# Patient Record
Sex: FEMALE | Race: WHITE | HISPANIC OR LATINO | Employment: PART TIME | URBAN - METROPOLITAN AREA
[De-identification: names, ages, dates, MRNs, and addresses within clinical notes are randomized per-mention and may not be internally consistent; named-entity substitution may affect disease eponyms.]

---
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
## 2024-02-21 ENCOUNTER — OFFICE VISIT (OUTPATIENT)
Dept: OBSTETRICS AND GYNECOLOGY | Age: 19
End: 2024-02-21
Payer: COMMERCIAL

## 2024-02-21 VITALS
SYSTOLIC BLOOD PRESSURE: 110 MMHG | HEIGHT: 67 IN | WEIGHT: 134 LBS | DIASTOLIC BLOOD PRESSURE: 64 MMHG | BODY MASS INDEX: 21.03 KG/M2

## 2024-02-21 DIAGNOSIS — N92.6 IRREGULAR PERIODS: Primary | ICD-10-CM

## 2024-02-21 DIAGNOSIS — R10.2 PELVIC PAIN: ICD-10-CM

## 2024-02-21 RX ORDER — NORGESTIMATE AND ETHINYL ESTRADIOL 0.25-0.035
1 KIT ORAL DAILY
Qty: 84 TABLET | Refills: 4 | Status: SHIPPED | OUTPATIENT
Start: 2024-02-21 | End: 2025-02-20

## 2024-02-21 NOTE — PROGRESS NOTES
"[facility]   Obstetrics and Gynecology   New Gynecology Visit    2/21/2024    Patient: Liana Diallo          MR#:[id number]    History of Present Illness    Chief Complaint   Patient presents with    Gynecologic Exam     New gyn: discuss irregular menses,increased clotting and cramping       18 y.o. female No obstetric history on file. who presents for irregular  periods. She has had irregular periods since her periods started when she was 11. She has been having anywhere from 1-4 periods per year since that time. Has very painful periods that will cause her to vomit. She states it hurts worse on her left, especially when she is active.           Obstetric History:  OB History    No obstetric history on file.        Menstrual History:     Patient's last menstrual period was 01/21/2024 (approximate).            ________________________________________  There is no problem list on file for this patient.    No past medical history on file.  Past Surgical History:   Procedure Laterality Date    WISDOM TOOTH EXTRACTION       Social History     Tobacco Use   Smoking Status Every Day    Types: Cigarettes   Smokeless Tobacco Not on file     Family History   Problem Relation Age of Onset    Diabetes Mother     Diabetes Maternal Grandmother     Diabetes Maternal Grandfather      Prior to Admission medications    Not on File     ________________________________________    The following portions of the patient's history were reviewed and updated as appropriate: allergies, current medications, past family history, past medical history, past social history, past surgical history, and problem list.           Objective     /64   Ht 170.2 cm (67\")   Wt 60.8 kg (134 lb)   LMP 01/21/2024 (Approximate)   BMI 20.99 kg/m²    BP Readings from Last 3 Encounters:   02/21/24 110/64   05/02/21 104/64 (28%, Z = -0.58 /  40%, Z = -0.25)*     *BP percentiles are based on the 2017 AAP Clinical Practice Guideline for " "girls      Wt Readings from Last 3 Encounters:   02/21/24 60.8 kg (134 lb) (62%, Z= 0.29)*   05/02/21 59 kg (130 lb) (66%, Z= 0.42)*     * Growth percentiles are based on [facility] (Girls, 2-20 Years) data.        BMI: Estimated body mass index is 20.99 kg/m² as calculated from the following:    Height as of this encounter: 170.2 cm (67\").    Weight as of this encounter: 60.8 kg (134 lb).    Physical Exam  Vitals and nursing note reviewed.   Constitutional:       Appearance: Normal appearance.   HENT:      Head: Normocephalic and atraumatic.   Pulmonary:      Effort: Pulmonary effort is normal.   Neurological:      Mental Status: She is alert and oriented to person, place, and time.   Psychiatric:         Mood and Affect: Mood normal.                 Assessment:  18 y.o. female No obstetric history on file. who presents for irregular  periods.  Diagnoses and all orders for this visit:    1. Irregular periods (Primary)  Comments:  Discussed various forms of menstrual regulation.  Patient interested in trying continuous OCPs at this time.  Follow-up in 3 months.  Orders:  -     norgestimate-ethinyl estradiol (ORTHO-CYCLEN) 0.25-35 MG-MCG per tablet; Take 1 tablet by mouth Daily. Continuous cycling  Dispense: 84 tablet; Refill: 4    2. Pelvic pain  Comments:  Having some pain on left side.  Will perform TVUS with next appointment.          Plan:  Return in about 3 months (around 5/21/2024).      Felisa Scott MD  2/21/2024 14:26 EST  "

## 2024-05-23 ENCOUNTER — OFFICE VISIT (OUTPATIENT)
Dept: OBSTETRICS AND GYNECOLOGY | Age: 19
End: 2024-05-23
Payer: COMMERCIAL

## 2024-05-23 VITALS
SYSTOLIC BLOOD PRESSURE: 110 MMHG | BODY MASS INDEX: 21.03 KG/M2 | DIASTOLIC BLOOD PRESSURE: 80 MMHG | WEIGHT: 134 LBS | HEIGHT: 67 IN

## 2024-05-23 DIAGNOSIS — Z30.015 ENCOUNTER FOR INITIAL PRESCRIPTION OF VAGINAL RING HORMONAL CONTRACEPTIVE: ICD-10-CM

## 2024-05-23 DIAGNOSIS — R10.2 PELVIC PAIN: Primary | ICD-10-CM

## 2024-05-23 RX ORDER — ETONOGESTREL AND ETHINYL ESTRADIOL VAGINAL RING .015; .12 MG/D; MG/D
1 RING VAGINAL
Qty: 3 EACH | Refills: 3 | Status: SHIPPED | OUTPATIENT
Start: 2024-05-23 | End: 2025-05-23

## 2024-05-23 NOTE — PROGRESS NOTES
[facility]   Obstetrics and Gynecology     5/23/2024      Patient:  Liana Diallo   MR#:[id number]    Office note    Chief Complaint   Patient presents with    Gynecologic Exam     Cc: 3 months follow up with Us today for pelvic pain mostly left side and irregular menses , menstrual cycles are controlled with bcp       Subjective     Gynecologic Exam      18 y.o. female No obstetric history on file.  Presenting for follow-up on pelvic pain.  She did have a large clot after the first month that she was on OCPs and so she did discontinue those.  Her pain has not changed since that time.  After further discussion today she would like to try NuvaRing.          There is no problem list on file for this patient.      No past medical history on file.  Past Surgical History:   Procedure Laterality Date    WISDOM TOOTH EXTRACTION       Obstetric History:  OB History    No obstetric history on file.        Menstrual History:     Patient's last menstrual period was 05/07/2024 (exact date).       No obstetric history on file.  Family History   Problem Relation Age of Onset    Diabetes Mother     Diabetes Maternal Grandmother     Diabetes Maternal Grandfather      Social History     Tobacco Use    Smoking status: Never   Vaping Use    Vaping status: Never Used   Substance Use Topics    Alcohol use: Defer    Drug use: Yes     Types: Marijuana     Patient has no known allergies.    Current Outpatient Medications:     etonogestrel-ethinyl estradiol (NuvaRing) 0.12-0.015 MG/24HR vaginal ring, Insert 1 each into the vagina Every 28 (Twenty-Eight) Days. Insert vaginally and leave in place for 3 consecutive weeks, then remove for 1 week., Disp: 3 each, Rfl: 3      Review of Systems   All other systems reviewed and are negative.      BP Readings from Last 3 Encounters:   05/23/24 110/80   02/21/24 110/64   05/02/21 104/64 (28%, Z = -0.58 /  40%, Z = -0.25)*     *BP percentiles are based on the 2017 AAP Clinical  "Practice Guideline for girls      Wt Readings from Last 3 Encounters:   05/23/24 60.8 kg (134 lb) (61%, Z= 0.27)*   02/21/24 60.8 kg (134 lb) (62%, Z= 0.29)*   05/02/21 59 kg (130 lb) (66%, Z= 0.42)*     * Growth percentiles are based on [facility] (Girls, 2-20 Years) data.      BMI: Estimated body mass index is 21.03 kg/m² as calculated from the following:    Height as of this encounter: 170 cm (66.93\").    Weight as of this encounter: 60.8 kg (134 lb). BSA: Estimated body surface area is 1.7 meters squared as calculated from the following:    Height as of this encounter: 170 cm (66.93\").    Weight as of this encounter: 60.8 kg (134 lb).    Objective   Physical Exam  Vitals and nursing note reviewed.   Constitutional:       Appearance: Normal appearance.   HENT:      Head: Normocephalic and atraumatic.   Pulmonary:      Effort: Pulmonary effort is normal.   Neurological:      Mental Status: She is alert and oriented to person, place, and time.   Psychiatric:         Mood and Affect: Mood normal.         Assessment & Plan   18 y.o. female No obstetric history on file.  Presenting for follow-up on pelvic pain.     Diagnoses and all orders for this visit:    1. Pelvic pain (Primary)  Comments:  - Disc that if NuvaRing does not work well for her pain we may consider a method such as IUD, or Nexplanon versus continuous OCPs to stop periods.  - TVUS wnl  Orders:  -     etonogestrel-ethinyl estradiol (NuvaRing) 0.12-0.015 MG/24HR vaginal ring; Insert 1 each into the vagina Every 28 (Twenty-Eight) Days. Insert vaginally and leave in place for 3 consecutive weeks, then remove for 1 week.  Dispense: 3 each; Refill: 3    2. Encounter for initial prescription of vaginal ring hormonal contraceptive  -     etonogestrel-ethinyl estradiol (NuvaRing) 0.12-0.015 MG/24HR vaginal ring; Insert 1 each into the vagina Every 28 (Twenty-Eight) Days. Insert vaginally and leave in place for 3 consecutive weeks, then remove for 1 week.  Dispense: 3 " each; Refill: 3        Return in about 6 months (around 11/23/2024).    Felisa Scott MD   5/23/2024 12:51 EDT

## 2024-12-11 ENCOUNTER — TELEPHONE (OUTPATIENT)
Dept: OBSTETRICS AND GYNECOLOGY | Age: 19
End: 2024-12-11
Payer: COMMERCIAL

## 2024-12-11 NOTE — TELEPHONE ENCOUNTER
I don't think this has anything to do with our office, she is our patient now but she is wanting to transfer care to another [facility] office so she shouldn't need any special approval unless she is pregnant. And we do not need to approve her 'leaving' our practice, that is her choice.  She needs to call whichever office she wants and schedule with them, not sure why [facility] sent this message to us asking for approval.

## 2024-12-11 NOTE — TELEPHONE ENCOUNTER
Caller: Liana Diallo  Female, 19 y.o., 2005  MRN: [id number]  CSN: [id number]    Relationship: SELF    Best call back number:  [phone]     Who is your current provider: DR. SOLARES    Is your current provider offboarding? NO    Who would you like your new provider to be: [facility]    What are your reasons for transferring care: PT HAS MOVED    Additional notes: PT IS REQUESTING A TRANSFER OF CARE DUE TO MOVING, PT STATES [facility] WOULD BE FINE    AT THE    [facility]  LOCATION

## 2025-05-07 DIAGNOSIS — Z30.015 ENCOUNTER FOR INITIAL PRESCRIPTION OF VAGINAL RING HORMONAL CONTRACEPTIVE: ICD-10-CM

## 2025-05-07 DIAGNOSIS — R10.2 PELVIC PAIN: ICD-10-CM

## 2025-05-08 RX ORDER — ETONOGESTREL AND ETHINYL ESTRADIOL VAGINAL RING .015; .12 MG/D; MG/D
RING VAGINAL
Qty: 3 EACH | Refills: 3 | OUTPATIENT
Start: 2025-05-08